# Patient Record
Sex: FEMALE | Race: BLACK OR AFRICAN AMERICAN | NOT HISPANIC OR LATINO | URBAN - METROPOLITAN AREA
[De-identification: names, ages, dates, MRNs, and addresses within clinical notes are randomized per-mention and may not be internally consistent; named-entity substitution may affect disease eponyms.]

---

## 2020-03-27 ENCOUNTER — EMERGENCY (EMERGENCY)
Facility: HOSPITAL | Age: 50
LOS: 1 days | Discharge: HOME | End: 2020-03-27
Admitting: EMERGENCY MEDICINE
Payer: COMMERCIAL

## 2020-03-27 VITALS
HEART RATE: 110 BPM | TEMPERATURE: 99 F | OXYGEN SATURATION: 96 % | SYSTOLIC BLOOD PRESSURE: 174 MMHG | HEIGHT: 67 IN | DIASTOLIC BLOOD PRESSURE: 84 MMHG | WEIGHT: 179.9 LBS | RESPIRATION RATE: 18 BRPM

## 2020-03-27 VITALS — OXYGEN SATURATION: 98 % | HEART RATE: 93 BPM

## 2020-03-27 DIAGNOSIS — J06.9 ACUTE UPPER RESPIRATORY INFECTION, UNSPECIFIED: ICD-10-CM

## 2020-03-27 DIAGNOSIS — R05 COUGH: ICD-10-CM

## 2020-03-27 DIAGNOSIS — Z79.899 OTHER LONG TERM (CURRENT) DRUG THERAPY: ICD-10-CM

## 2020-03-27 DIAGNOSIS — J45.909 UNSPECIFIED ASTHMA, UNCOMPLICATED: ICD-10-CM

## 2020-03-27 DIAGNOSIS — Z20.828 CONTACT WITH AND (SUSPECTED) EXPOSURE TO OTHER VIRAL COMMUNICABLE DISEASES: ICD-10-CM

## 2020-03-27 DIAGNOSIS — Z90.710 ACQUIRED ABSENCE OF BOTH CERVIX AND UTERUS: Chronic | ICD-10-CM

## 2020-03-27 PROCEDURE — 99284 EMERGENCY DEPT VISIT MOD MDM: CPT

## 2020-03-27 RX ORDER — ALBUTEROL 90 UG/1
2 AEROSOL, METERED ORAL
Qty: 1 | Refills: 0
Start: 2020-03-27 | End: 2020-04-25

## 2020-03-27 NOTE — ED ADULT NURSE NOTE - NSIMPLEMENTINTERV_GEN_ALL_ED
Implemented All Universal Safety Interventions:  [place] to call system. Call bell, personal items and telephone within reach. Instruct patient to call for assistance. Room bathroom lighting operational. Non-slip footwear when patient is off stretcher. Physically safe environment: no spills, clutter or unnecessary equipment. Stretcher in lowest position, wheels locked, appropriate side rails in place.

## 2020-03-27 NOTE — ED PROVIDER NOTE - PATIENT PORTAL LINK FT
You can access the FollowMyHealth Patient Portal offered by [facility] by registering at the following website: [email or web address]. By joining [employer]’s FollowMyHealth portal, you will also be able to view your health information using other applications (apps) compatible with our system.

## 2020-03-27 NOTE — ED ADULT TRIAGE NOTE - CHIEF COMPLAINT QUOTE
Pt with complaint of cough, and low grade fever for one week.  Seen by MD and given cough medicine.  Pt with history of Asthma, and anxiety.  Denies recent travel.  Endorses recent exposure to positive individuals.  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

## 2020-03-27 NOTE — ED PROVIDER NOTE - OBJECTIVE STATEMENT
51 yo F w/ PMHx of remote asthma (no inhaler use for several yrs) c/o low grade fever, productive cough with white sputum x 2 weeks and chest tightness x 1 week. Pt states symptoms are similar to previous asthma previous attacks. Pt is a  for [employer] and her significant other is an ED nurse; pt notes both work colleagues and boyfriend are currently under quarantine and are positive for COVID-19. Pt has not used an inhaler nor does she have one. denies chills, dizziness, SOB, abdominal pain, N/V/D, sore throat.

## 2020-03-27 NOTE — ED PROVIDER NOTE - NSFOLLOWUPINSTRUCTIONS_ED_ALL_ED_FT
THE COVID 19 TEST   - results may take up to 5-7 days to become available   - if your result is positive, you will receive a phone call from one of our coronavirus specialists   - negative result may not be communicated on time due to the sheer volume of testing from the ER. If you haven't heard from us within 5 days, you can check [facility] or call [phone] (please do not call the ER for results)    Please continue to self quarantine (home isolation) until your result is back and following instructions accordingly  - positive: complete home isolation for a total of 14 days since day of testing and no more fever with feeling back to baseline   - negative: you will be able to stop home isolation but still practice standard precautions, similar to how you would manage a regular flu/cold     Return to ER for any worsening symptoms, such as persistent fever >100.4F, shortness of breath, coughing up bloody sputum, chest pain, lethargy, and fainting    Please remember to wash your hands frequently, avoid touching your face, and cover your cough/sneeze     TAKE INHALER 2-5 PUMPS EVERY 4-6 HOURS FOR 5 DAYS.     TAKE TYLENOL 650MG EVERY 6 HOURS AS NEEDED FOR FEVER AND BODY ACHES.     CONTINUE YOUR COUGH MEDICATION.

## 2020-03-27 NOTE — ED PROVIDER NOTE - CLINICAL SUMMARY MEDICAL DECISION MAKING FREE TEXT BOX
Pt with URI symptoms x 2 weeks. Exam unremarkable, VS stable. Pt is non-toxic appearing. Pt with contact with positive COVID-19. Will order rapid flu, RSV, RVP and COVID-19. walk test to check oxygenation; will send prescription for Albuterol inhaler. discuss with patient to continue self quarantine and ED return precautions if symptoms worsen.

## 2020-03-28 LAB — SARS-COV-2 RNA SPEC QL NAA+PROBE: DETECTED

## 2023-01-17 PROBLEM — J45.909 UNSPECIFIED ASTHMA, UNCOMPLICATED: Chronic | Status: ACTIVE | Noted: 2020-03-27

## 2023-02-02 PROBLEM — Z00.00 ENCOUNTER FOR PREVENTIVE HEALTH EXAMINATION: Status: ACTIVE | Noted: 2023-02-02

## 2023-02-08 ENCOUNTER — APPOINTMENT (OUTPATIENT)
Dept: HEART AND VASCULAR | Facility: CLINIC | Age: 53
End: 2023-02-08
Payer: COMMERCIAL

## 2023-02-08 ENCOUNTER — NON-APPOINTMENT (OUTPATIENT)
Age: 53
End: 2023-02-08

## 2023-02-08 VITALS
TEMPERATURE: 98.2 F | HEIGHT: 66 IN | SYSTOLIC BLOOD PRESSURE: 130 MMHG | BODY MASS INDEX: 30.22 KG/M2 | WEIGHT: 188 LBS | OXYGEN SATURATION: 98 % | HEART RATE: 85 BPM | DIASTOLIC BLOOD PRESSURE: 78 MMHG

## 2023-02-08 VITALS — DIASTOLIC BLOOD PRESSURE: 80 MMHG | SYSTOLIC BLOOD PRESSURE: 160 MMHG

## 2023-02-08 DIAGNOSIS — G47.39 OTHER SLEEP APNEA: ICD-10-CM

## 2023-02-08 PROCEDURE — 99203 OFFICE O/P NEW LOW 30 MIN: CPT

## 2023-02-09 NOTE — HISTORY OF PRESENT ILLNESS
[FreeTextEntry1] : 52 F Anemia \par \par referred by Dr. Claudine Gould for sob. She is sob with minimal exertion. started months ago. She feels like she sob just sweeping her house. started one y ear ago and has been progressively worse. She is exhausted she snores a lot at night and is a heavy snorer. \par \par ecg nsr 2/8/2023 \par \par

## 2023-02-09 NOTE — ASSESSMENT
[FreeTextEntry1] : - obtain labs from dr manzo office\par - echo and est and holter rule out pvc \par - home bp monitoring \par - fu after testing

## 2023-03-02 ENCOUNTER — APPOINTMENT (OUTPATIENT)
Dept: HEART AND VASCULAR | Facility: CLINIC | Age: 53
End: 2023-03-02
Payer: COMMERCIAL

## 2023-03-02 PROCEDURE — ZZZZZ: CPT

## 2023-03-02 PROCEDURE — 93306 TTE W/DOPPLER COMPLETE: CPT

## 2023-03-02 PROCEDURE — 93015 CV STRESS TEST SUPVJ I&R: CPT

## 2023-03-31 ENCOUNTER — APPOINTMENT (OUTPATIENT)
Dept: SLEEP CENTER | Facility: HOME HEALTH | Age: 53
End: 2023-03-31

## 2023-06-22 ENCOUNTER — APPOINTMENT (OUTPATIENT)
Dept: HEART AND VASCULAR | Facility: CLINIC | Age: 53
End: 2023-06-22

## 2024-03-28 ENCOUNTER — APPOINTMENT (OUTPATIENT)
Dept: NEUROLOGY | Facility: CLINIC | Age: 54
End: 2024-03-28

## 2024-04-09 ENCOUNTER — APPOINTMENT (OUTPATIENT)
Dept: HEART AND VASCULAR | Facility: CLINIC | Age: 54
End: 2024-04-09
Payer: COMMERCIAL

## 2024-04-09 ENCOUNTER — NON-APPOINTMENT (OUTPATIENT)
Age: 54
End: 2024-04-09

## 2024-04-09 VITALS
HEIGHT: 66 IN | BODY MASS INDEX: 29.25 KG/M2 | WEIGHT: 182 LBS | DIASTOLIC BLOOD PRESSURE: 88 MMHG | TEMPERATURE: 98 F | OXYGEN SATURATION: 98 % | HEART RATE: 98 BPM | SYSTOLIC BLOOD PRESSURE: 158 MMHG

## 2024-04-09 VITALS — SYSTOLIC BLOOD PRESSURE: 130 MMHG | DIASTOLIC BLOOD PRESSURE: 80 MMHG

## 2024-04-09 DIAGNOSIS — R06.02 SHORTNESS OF BREATH: ICD-10-CM

## 2024-04-09 PROCEDURE — 99214 OFFICE O/P EST MOD 30 MIN: CPT | Mod: 25

## 2024-04-09 PROCEDURE — 93000 ELECTROCARDIOGRAM COMPLETE: CPT

## 2024-04-10 NOTE — HISTORY OF PRESENT ILLNESS
[FreeTextEntry1] : 52 F Anemia   syncope felt nauseas sweaty was very tired was up all night packing did not eat anything was very tired. an hour into the flight felt lightheaded went to bend down to get her orange she got up to get orange juice went to drink the juice and does not remember the rest. does remember she was running to the bathroom a lot pre flight.  she had urine and bowel incontinence.    ecg nsr 4/9/2024

## 2024-04-10 NOTE — ASSESSMENT
[FreeTextEntry1] : - obtain labs from dr manzo office - echo for syncope vasovagal syncope  - home bp monitoring bp tends to fluctuate - fu after testing

## 2024-04-22 ENCOUNTER — APPOINTMENT (OUTPATIENT)
Dept: HEART AND VASCULAR | Facility: CLINIC | Age: 54
End: 2024-04-22
Payer: COMMERCIAL

## 2024-04-22 PROCEDURE — 93306 TTE W/DOPPLER COMPLETE: CPT

## 2024-04-23 ENCOUNTER — APPOINTMENT (OUTPATIENT)
Dept: NEUROLOGY | Facility: CLINIC | Age: 54
End: 2024-04-23
Payer: COMMERCIAL

## 2024-04-23 VITALS
BODY MASS INDEX: 29.73 KG/M2 | HEART RATE: 67 BPM | SYSTOLIC BLOOD PRESSURE: 148 MMHG | WEIGHT: 185 LBS | OXYGEN SATURATION: 100 % | DIASTOLIC BLOOD PRESSURE: 89 MMHG | HEIGHT: 66 IN | TEMPERATURE: 98.3 F

## 2024-04-23 DIAGNOSIS — R55 SYNCOPE AND COLLAPSE: ICD-10-CM

## 2024-04-23 PROCEDURE — 99204 OFFICE O/P NEW MOD 45 MIN: CPT

## 2024-04-23 NOTE — PHYSICAL EXAM
[FreeTextEntry1] : General: Constitutional: Sitting comfortably in NAD Psychiatric: well-groomed, appropriate affect, insight/judgement intact Ears, Nose, Throat: no abnormalities, mucus membranes moist Extremities: no edema, clubbing, or cyanosis Skin: no rash or neurocutaneous signs  Cognitive: Orientation, language, memory and knowledge screens intact Cranial Nerves: II: Full to confrontation; disc margins sharp. III/IV/VI: PERRL EOMI, no nystagmus [id number]: Symmetric. VII: Face appears symmetric. VIII: Normal to screening, IX/X: Palate elevates symmetrical. XI: Trapezius symmetric. XII: Tongue midline  Motor: Power: 5/5 throughout Tone: Normal x4 limbs Tremor: none  Sensation: intact to light touch  Coordination/Gait: Finger-nose-finger intact, normal rapid-alternating movements Fine motor normal with normal rapid finger taps and heel tapping Romberg negative  Reflexes:

## 2024-04-23 NOTE — DISCUSSION/SUMMARY
[FreeTextEntry1] : Impression: 1) Seizure vs syncope - one episode in her life while on a flight while sleep deprived and dehydrated/hypoglycemic. Came to quickly, but associated with bladder/bowel incontinence. MRI normal  Plan: 1) rEEG

## 2024-04-23 NOTE — HISTORY OF PRESENT ILLNESS
[FreeTextEntry1] : Meredith is a 54 year old female presenting after an episode of syncope.  March 1st on a flight to [place]. Not feeling well that morning - was sleep deprived, recovering from viral illness. Mid-way through the flight, stood up - noting she really needed to use the bathroom, when she suddenly felt lightheaded and nauseous. Had not eaten anything that day. Asked  for apple juice and suddenly lost consciousness. Came to on the floor with a few nurse passengers surrounding her. Pt unsure how long she was out for, maybe only a few seconds. Woke up with bladder and bowel incontinence.   Taken to ED in [place]. CT head normal.   Saw neurologist when she returned to [place], ordered MRI and wanted to start pt on Lamotrigine, but pt hesitant to start meds before further testing. No history of EEG.  Following with cardiology here - Echo and EKG normal.   Working as a  for [employer].

## 2024-04-29 ENCOUNTER — APPOINTMENT (OUTPATIENT)
Dept: NEUROLOGY | Facility: CLINIC | Age: 54
End: 2024-04-29
Payer: COMMERCIAL

## 2024-04-29 PROCEDURE — 95816 EEG AWAKE AND DROWSY: CPT

## 2024-04-30 ENCOUNTER — APPOINTMENT (OUTPATIENT)
Dept: NEUROLOGY | Facility: CLINIC | Age: 54
End: 2024-04-30

## 2024-05-23 ENCOUNTER — APPOINTMENT (OUTPATIENT)
Dept: NEUROLOGY | Facility: CLINIC | Age: 54
End: 2024-05-23

## 2024-07-10 ENCOUNTER — APPOINTMENT (OUTPATIENT)
Dept: HEART AND VASCULAR | Facility: CLINIC | Age: 54
End: 2024-07-10

## 2024-08-01 ENCOUNTER — NON-APPOINTMENT (OUTPATIENT)
Age: 54
End: 2024-08-01

## 2024-08-01 ENCOUNTER — APPOINTMENT (OUTPATIENT)
Dept: HEART AND VASCULAR | Facility: CLINIC | Age: 54
End: 2024-08-01
Payer: COMMERCIAL

## 2024-08-01 VITALS — DIASTOLIC BLOOD PRESSURE: 80 MMHG | SYSTOLIC BLOOD PRESSURE: 150 MMHG

## 2024-08-01 VITALS
HEIGHT: 66 IN | TEMPERATURE: 98.5 F | SYSTOLIC BLOOD PRESSURE: 140 MMHG | WEIGHT: 186 LBS | OXYGEN SATURATION: 97 % | HEART RATE: 67 BPM | DIASTOLIC BLOOD PRESSURE: 70 MMHG | BODY MASS INDEX: 29.89 KG/M2

## 2024-08-01 DIAGNOSIS — Z82.3 FAMILY HISTORY OF STROKE: ICD-10-CM

## 2024-08-01 DIAGNOSIS — Z82.49 FAMILY HISTORY OF ISCHEMIC HEART DISEASE AND OTHER DISEASES OF THE CIRCULATORY SYSTEM: ICD-10-CM

## 2024-08-01 PROCEDURE — 93000 ELECTROCARDIOGRAM COMPLETE: CPT

## 2024-08-01 PROCEDURE — 99213 OFFICE O/P EST LOW 20 MIN: CPT | Mod: 25

## 2024-08-02 DIAGNOSIS — I10 ESSENTIAL (PRIMARY) HYPERTENSION: ICD-10-CM

## 2024-08-02 RX ORDER — SEMAGLUTIDE 0.25 MG/.5ML
0.25 INJECTION, SOLUTION SUBCUTANEOUS
Qty: 4 | Refills: 5 | Status: DISCONTINUED | COMMUNITY
Start: 2024-08-01 | End: 2024-08-02

## 2024-08-02 RX ORDER — AMLODIPINE BESYLATE 5 MG/1
5 TABLET ORAL DAILY
Qty: 90 | Refills: 2 | Status: ACTIVE | COMMUNITY
Start: 2024-08-02 | End: 1900-01-01

## 2024-08-02 RX ORDER — TIRZEPATIDE 2.5 MG/.5ML
2.5 INJECTION, SOLUTION SUBCUTANEOUS
Qty: 4 | Refills: 5 | Status: DISCONTINUED | COMMUNITY
Start: 2024-08-01 | End: 2024-08-02

## 2024-08-02 NOTE — ASSESSMENT
[FreeTextEntry1] : - bp is elevated on multiple readings - hesitant to start meds - will do ABPM - Wegovy for weight loss  - fu in three months - we discussed starting amlodipine 5 mg daily she wants to wait for ABPM

## 2024-08-02 NOTE — HISTORY OF PRESENT ILLNESS
[FreeTextEntry1] : 52 F Anemia vasovagal syncope  feels well no further episodes of syncope she has HTN at this point no cardiac complaints    ecg nsr  8/1/24 echo EF normal mild LVH 4/2024

## 2024-11-14 ENCOUNTER — APPOINTMENT (OUTPATIENT)
Dept: HEART AND VASCULAR | Facility: CLINIC | Age: 54
End: 2024-11-14
Payer: COMMERCIAL

## 2024-11-14 ENCOUNTER — NON-APPOINTMENT (OUTPATIENT)
Age: 54
End: 2024-11-14

## 2024-11-14 VITALS
WEIGHT: 186 LBS | SYSTOLIC BLOOD PRESSURE: 152 MMHG | BODY MASS INDEX: 29.89 KG/M2 | HEART RATE: 78 BPM | OXYGEN SATURATION: 98 % | DIASTOLIC BLOOD PRESSURE: 90 MMHG | TEMPERATURE: 98.4 F | HEIGHT: 66 IN

## 2024-11-14 DIAGNOSIS — R06.02 SHORTNESS OF BREATH: ICD-10-CM

## 2024-11-14 PROCEDURE — 99214 OFFICE O/P EST MOD 30 MIN: CPT | Mod: 25

## 2024-11-14 PROCEDURE — 93000 ELECTROCARDIOGRAM COMPLETE: CPT

## 2025-05-14 ENCOUNTER — NON-APPOINTMENT (OUTPATIENT)
Age: 55
End: 2025-05-14

## 2025-05-14 ENCOUNTER — APPOINTMENT (OUTPATIENT)
Dept: HEART AND VASCULAR | Facility: CLINIC | Age: 55
End: 2025-05-14
Payer: COMMERCIAL

## 2025-05-14 VITALS
OXYGEN SATURATION: 98 % | WEIGHT: 186 LBS | TEMPERATURE: 98.2 F | HEART RATE: 86 BPM | HEIGHT: 66 IN | BODY MASS INDEX: 29.89 KG/M2 | SYSTOLIC BLOOD PRESSURE: 140 MMHG | DIASTOLIC BLOOD PRESSURE: 84 MMHG

## 2025-05-14 DIAGNOSIS — I10 ESSENTIAL (PRIMARY) HYPERTENSION: ICD-10-CM

## 2025-05-14 DIAGNOSIS — E11.9 TYPE 2 DIABETES MELLITUS W/OUT COMPLICATIONS: ICD-10-CM

## 2025-05-14 PROCEDURE — 36415 COLL VENOUS BLD VENIPUNCTURE: CPT

## 2025-05-14 PROCEDURE — 93000 ELECTROCARDIOGRAM COMPLETE: CPT

## 2025-05-14 PROCEDURE — 99214 OFFICE O/P EST MOD 30 MIN: CPT

## 2025-05-15 LAB
ALBUMIN SERPL ELPH-MCNC: 4.5 G/DL
ALP BLD-CCNC: 90 U/L
ALT SERPL-CCNC: 27 U/L
ANION GAP SERPL CALC-SCNC: 17 MMOL/L
AST SERPL-CCNC: 20 U/L
BILIRUB SERPL-MCNC: <0.2 MG/DL
BUN SERPL-MCNC: 17 MG/DL
CALCIUM SERPL-MCNC: 9.6 MG/DL
CHLORIDE SERPL-SCNC: 104 MMOL/L
CHOLEST SERPL-MCNC: 182 MG/DL
CO2 SERPL-SCNC: 22 MMOL/L
CREAT SERPL-MCNC: 0.94 MG/DL
CREAT SPEC-SCNC: 144 MG/DL
EGFRCR SERPLBLD CKD-EPI 2021: 71 ML/MIN/1.73M2
ESTIMATED AVERAGE GLUCOSE: 128 MG/DL
GLUCOSE SERPL-MCNC: 94 MG/DL
HBA1C MFR BLD HPLC: 6.1 %
HDLC SERPL-MCNC: 44 MG/DL
LDLC SERPL-MCNC: 107 MG/DL
MICROALBUMIN 24H UR DL<=1MG/L-MCNC: <1.2 MG/DL
MICROALBUMIN/CREAT 24H UR-RTO: NORMAL MG/G
NONHDLC SERPL-MCNC: 138 MG/DL
NT-PROBNP SERPL-MCNC: 57 PG/ML
POTASSIUM SERPL-SCNC: 4 MMOL/L
PROT SERPL-MCNC: 7.4 G/DL
SODIUM SERPL-SCNC: 142 MMOL/L
TRIGL SERPL-MCNC: 179 MG/DL

## 2025-05-16 ENCOUNTER — NON-APPOINTMENT (OUTPATIENT)
Age: 55
End: 2025-05-16

## 2025-05-16 DIAGNOSIS — R03.0 ELEVATED BLOOD-PRESSURE READING, W/OUT DIAGNOSIS OF HYPERTENSION: ICD-10-CM

## 2025-05-16 PROCEDURE — 93784 AMBL BP MNTR W/SOFTWARE: CPT

## 2025-05-21 RX ORDER — TIRZEPATIDE 2.5 MG/.5ML
2.5 INJECTION, SOLUTION SUBCUTANEOUS
Qty: 4 | Refills: 3 | Status: ACTIVE | COMMUNITY
Start: 2025-05-14 | End: 1900-01-01

## 2025-08-05 ENCOUNTER — APPOINTMENT (OUTPATIENT)
Dept: HEART AND VASCULAR | Facility: CLINIC | Age: 55
End: 2025-08-05
Payer: COMMERCIAL

## 2025-08-05 VITALS
OXYGEN SATURATION: 97 % | HEIGHT: 66 IN | HEART RATE: 81 BPM | SYSTOLIC BLOOD PRESSURE: 134 MMHG | TEMPERATURE: 98 F | WEIGHT: 185 LBS | BODY MASS INDEX: 29.73 KG/M2 | DIASTOLIC BLOOD PRESSURE: 86 MMHG

## 2025-08-05 DIAGNOSIS — I10 ESSENTIAL (PRIMARY) HYPERTENSION: ICD-10-CM

## 2025-08-05 PROCEDURE — 99214 OFFICE O/P EST MOD 30 MIN: CPT

## 2025-08-05 PROCEDURE — 36415 COLL VENOUS BLD VENIPUNCTURE: CPT

## 2025-08-05 PROCEDURE — 93000 ELECTROCARDIOGRAM COMPLETE: CPT

## 2025-08-05 PROCEDURE — G2211 COMPLEX E/M VISIT ADD ON: CPT | Mod: NC

## 2025-08-06 LAB
ALBUMIN SERPL ELPH-MCNC: 4.4 G/DL
ALBUMIN, RANDOM URINE: 1.7 MG/DL
ALP BLD-CCNC: 85 U/L
ALT SERPL-CCNC: 24 U/L
ANION GAP SERPL CALC-SCNC: 12 MMOL/L
AST SERPL-CCNC: 18 U/L
BILIRUB SERPL-MCNC: 0.3 MG/DL
BUN SERPL-MCNC: 11 MG/DL
CALCIUM SERPL-MCNC: 9.3 MG/DL
CHLORIDE SERPL-SCNC: 104 MMOL/L
CHOLEST SERPL-MCNC: 175 MG/DL
CO2 SERPL-SCNC: 24 MMOL/L
CREAT SERPL-MCNC: 0.8 MG/DL
CREAT SPEC-SCNC: 214 MG/DL
EGFRCR SERPLBLD CKD-EPI 2021: 86 ML/MIN/1.73M2
ESTIMATED AVERAGE GLUCOSE: 134 MG/DL
GLUCOSE SERPL-MCNC: 86 MG/DL
HBA1C MFR BLD HPLC: 6.3 %
HDLC SERPL-MCNC: 45 MG/DL
LDLC SERPL-MCNC: 116 MG/DL
MICROALBUMIN/CREAT 24H UR-RTO: 8 MG/G
NONHDLC SERPL-MCNC: 131 MG/DL
NT-PROBNP SERPL-MCNC: 55 PG/ML
POTASSIUM SERPL-SCNC: 4.1 MMOL/L
PROT SERPL-MCNC: 7.2 G/DL
SODIUM SERPL-SCNC: 140 MMOL/L
TRIGL SERPL-MCNC: 75 MG/DL